# Patient Record
Sex: FEMALE | Race: WHITE
[De-identification: names, ages, dates, MRNs, and addresses within clinical notes are randomized per-mention and may not be internally consistent; named-entity substitution may affect disease eponyms.]

---

## 2019-08-30 ENCOUNTER — HOSPITAL ENCOUNTER (EMERGENCY)
Dept: HOSPITAL 25 - UCCORT | Age: 59
Discharge: HOME HEALTH | End: 2019-08-30
Payer: COMMERCIAL

## 2019-08-30 VITALS — SYSTOLIC BLOOD PRESSURE: 154 MMHG | DIASTOLIC BLOOD PRESSURE: 68 MMHG

## 2019-08-30 DIAGNOSIS — R10.13: Primary | ICD-10-CM

## 2019-08-30 DIAGNOSIS — E78.5: ICD-10-CM

## 2019-08-30 DIAGNOSIS — I10: ICD-10-CM

## 2019-08-30 PROCEDURE — 99212 OFFICE O/P EST SF 10 MIN: CPT

## 2019-08-30 PROCEDURE — G0463 HOSPITAL OUTPT CLINIC VISIT: HCPCS

## 2019-08-30 NOTE — UC
Abdominal Pain Female HPI





- HPI Summary


HPI Summary: 


59-year-old female presents with onset of epigastric discomfort and bloating.  

Patient states that she has been followed by gastroenterology for some chronic 

constipation however her current symptoms are different than what she has had 

with the constipation.  States last night she developed a subjective fever and 

chills.  States she has been able to eat and drink although has a decreased 

appetite at this time.  She states that throughout the day today the abdominal 

bloating seems to be getting worse.  States it feels like she is gained 30-40 

pounds throughout the day.  Last bowel movement was this morning.  States it 

was a normal color and consistency for her.  Denies abdominal pain, nausea, 

vomiting, blood in stool, melena, or urinary symptoms.








- History of Current Complaint


Chief Complaint: UCAbdominalPain


Stated Complaint: FEVER,ABD DISCOMFORT


Time Seen by Provider: 08/30/19 12:11


Hx Obtained From: Patient


Pain Intensity: 0


Allergies/Adverse Reactions: 


 Allergies











Allergy/AdvReac Type Severity Reaction Status Date / Time


 


amoxicillin [From Augmentin] Allergy  Hives Verified 08/30/19 11:39


 


clavulanic acid Allergy  Hives Verified 08/30/19 11:39





[From Augmentin]     











Home Medications: 


 Home Medications





Doxylamine/Phenylep/Dm/Aspirin [[place] Day-Night Tab Eff] 1 each PO ONCE 

08/30/19 [History Confirmed 08/30/19]


Hydrochlorothiazide TAB* [Hydrodiuril TAB*] 25 mg PO DAILY 08/30/19 [History 

Confirmed 08/30/19]


Linaclotide [Linzess] 145 mcg PO DAILY 08/30/19 [History Confirmed 08/30/19]


Losartan Potassium 100 mg PO DAILY 08/30/19 [History Confirmed 08/30/19]


Potassium Chlor TAB* [Klor Con ER TAB*] 10 meq PO DAILY 08/30/19 [History 

Confirmed 08/30/19]


Simvastatin 20 mg PO DAILY 08/30/19 [History Confirmed 08/30/19]











PMH/Surg Hx/FS Hx/Imm Hx


Endocrine History: Dyslipidemia


Cardiovascular History: Hypertension


GI/ History: Other - Chronic constipation





- Surgical History


Surgical History: Yes


Surgery Procedure, Year, and Place: tubaligation.  1 1/2 ovaries removed- cysts





- Family History


Known Family History: Positive: Non-Contributory





- Social History


Occupation: Employed Full-time


Lives: Alone


Alcohol Use: Occasionally


Substance Use Type: None


Smoking Status (MU): Never Smoked Tobacco





Review of Systems


All Other Systems Reviewed And Are Negative: Yes


Constitutional: Positive: Fever, Chills


Skin: Positive: Negative


Respiratory: Positive: Negative


Cardiovascular: Positive: Negative


Gastrointestinal: Positive: Abdominal Pain - Discomfort and bloating.  Negative

: Vomiting, Diarrhea, Nausea


Genitourinary: Negative: Dysuria, Hematuria, Frequency, Urgency


Musculoskeletal: Positive: Negative


Neurological: Positive: Negative


Is Patient Immunocompromised?: No





Physical Exam





- Summary


Physical Exam Summary: 


GENERAL APPEARANCE: Alert and cooperative, obese female who appears to be in no 

acute distress.





CARDIAC: Normal S1 and S2. No S3, S4 or murmurs. Rhythm is regular. There is no 

peripheral edema, cyanosis or pallor. Extremities are warm and well perfused. 

Capillary refill is less than 2 seconds. Peripheral pulses intact.





LUNGS: Clear to auscultation without rales, rhonchi, wheezing or diminished 

breath sounds.





ABDOMEN: Positive bowel sounds. Soft, nondistended, nontender. No guarding or 

rebound. No masses or hepatosplenomegally. No CVA tenderness.





MUSKULOSKELETAL: ROM intact to all extremities. No joint erythema or 

tenderness. Normal muscular development. Normal gait.





SKIN: Skin normal color, texture and turgor with no lesions or eruptions.





Triage Information Reviewed: Yes


Vital Signs: 


 Initial Vital Signs











Temp  99.5 F   08/30/19 11:43


 


Pulse  71   08/30/19 11:43


 


Resp  18   08/30/19 11:43


 


BP  154/68   08/30/19 11:43


 


Pulse Ox  99   08/30/19 11:43











Vital Signs Reviewed: Yes





Abd Pain Female Course/Dx





- Course


Course Of Treatment: 


59-year-old female presents with onset of epigastric discomfort and bloating.  

Patient states that she has been followed by gastroenterology for some chronic 

constipation however her current symptoms are different than what she has had 

with the constipation.  States last night she developed a subjective fever and 

chills.  States she has been able to eat and drink although has a decreased 

appetite at this time.  She states that throughout the day today the abdominal 

bloating seems to be getting worse.  States it feels like she is gained 30-40 

pounds throughout the day.  Last bowel movement was this morning.  States it 

was a normal color and consistency for her.  Denies abdominal pain, nausea, 

vomiting, blood in stool, melena, or urinary symptoms.  Afebrile.  Hypertensive 

otherwise vital signs stable.  Patient's overall exam was unremarkable.  I 

discussed with the patient that I have a low suspicion for any acute abdominal 

pathology however with her progressively worsening symptoms I cannot fully rule 

this out at this time.  I'm recommending that she go to the emergency room for 

further evaluation.  Patient is agreeable to this and is electing to transport 

via private vehicle.








- Differential Dx/Diagnosis


Differential Diagnosis: Bowel Obstruction, Constipation, Gall Bladder Disease, 

Pancreatitis, Peptic Ulcer Disease


Provider Diagnosis: 


 Acute abdominal complaint








Discharge ED





- Sign-Out/Discharge


Documenting (check all that apply): Patient Departure


All imaging exams completed and their final reports reviewed: No Studies





- Discharge Plan


Condition: Stable


Disposition: HOME-RECOMMEND TO ED


Patient Education Materials:  Acute Abdominal Pain (ED)


Referrals: 


Stella Mills MD [Primary Care Provider] - 


Additional Instructions: 


Your exam in the clinic today and her vital signs were normal however with the 

reports of progressively worsening symptoms I am recommending that you be 

evaluated in the emergency room at this time.  Go directly to the emergency 

room from here.  Do not eat or drink anything until after you have been 

evaluated.





- Billing Disposition and Condition


Condition: STABLE


Disposition: Home-Recommend to ED





- Attestation Statements


Provider Attestation: 





I was available for consult. This patient was seen by the [facility]. The patient was 

not presented to, seen by, or examined by me. -Kristina